# Patient Record
Sex: FEMALE | Race: BLACK OR AFRICAN AMERICAN | NOT HISPANIC OR LATINO | Employment: FULL TIME | URBAN - METROPOLITAN AREA
[De-identification: names, ages, dates, MRNs, and addresses within clinical notes are randomized per-mention and may not be internally consistent; named-entity substitution may affect disease eponyms.]

---

## 2017-07-10 ENCOUNTER — HOSPITAL ENCOUNTER (EMERGENCY)
Facility: HOSPITAL | Age: 32
Discharge: HOME | End: 2017-07-10
Attending: EMERGENCY MEDICINE

## 2017-07-10 VITALS
TEMPERATURE: 98 F | HEART RATE: 78 BPM | HEIGHT: 62 IN | RESPIRATION RATE: 16 BRPM | WEIGHT: 145 LBS | OXYGEN SATURATION: 100 % | DIASTOLIC BLOOD PRESSURE: 83 MMHG | SYSTOLIC BLOOD PRESSURE: 155 MMHG | BODY MASS INDEX: 26.68 KG/M2

## 2017-07-10 DIAGNOSIS — K08.89 TOOTHACHE: ICD-10-CM

## 2017-07-10 DIAGNOSIS — L30.8 PRURITIC DERMATITIS: Primary | ICD-10-CM

## 2017-07-10 DIAGNOSIS — K04.7 DENTAL ABSCESS: ICD-10-CM

## 2017-07-10 PROCEDURE — 99282 EMERGENCY DEPT VISIT SF MDM: CPT

## 2017-07-10 RX ORDER — NAPROXEN 375 MG/1
375 TABLET ORAL 2 TIMES DAILY WITH MEALS
Qty: 30 TABLET | Refills: 0 | Status: SHIPPED | OUTPATIENT
Start: 2017-07-10

## 2017-07-10 RX ORDER — FLUOCINONIDE 0.5 MG/G
OINTMENT TOPICAL 2 TIMES DAILY
Qty: 15 G | Refills: 0 | Status: SHIPPED | OUTPATIENT
Start: 2017-07-10 | End: 2017-07-20

## 2017-07-10 RX ORDER — PENICILLIN V POTASSIUM 500 MG/1
500 TABLET, FILM COATED ORAL 4 TIMES DAILY
Qty: 40 TABLET | Refills: 0 | Status: SHIPPED | OUTPATIENT
Start: 2017-07-10 | End: 2017-07-17

## 2017-07-10 NOTE — ED NOTES
Pt seen, evaluated, and discharged to lobby by provider without nursing assessment. See provider notes.

## 2017-07-10 NOTE — ED PROVIDER NOTES
SCRIBE #1 NOTE: I, Craig Soler, am scribing for, and in the presence of, Andrew aRusch MD. I have scribed the entire note.      History      Chief Complaint   Patient presents with    Wound Check     needs papers stating that her abscess was cleared up from 6 weeks ago; also has rash        Review of patient's allergies indicates:  No Known Allergies     HPI   HPI    7/10/2017, 5:44 PM   History obtained from the patient      History of Present Illness: Shereen Yadav is a 32 y.o. female patient who presents to the Emergency Department for a rash to her back which onset gradually 1 day ago. Symptoms are constant and moderate in severity. Pt also has left sided dental pain. Pt need clearance for steroids injections to her back stemming from an abscess 6 weeks ago.  No mitigating or exacerbating factors reported. No other associated sx reported. Patient denies any fever, chills, n/v/d, HA, lightheadedness, dizziness, CP, SOB, trouble swallowing, sore throat, cough, and all other sxs at this time. No further complaints or concerns at this time.         Arrival mode: Personal vehicle     PCP: Bertram Llamas MD       Past Medical History:  Unknown    Past Surgical History:  Unknown    Family History:  Unknown    Social History:  Social History     Social History Main Topics    Smoking status: Current Every Day Smoker     Types: Cigars    Smokeless tobacco: Unknown    Alcohol use No    Drug use: No    Sexual activity: Unknown       ROS   Review of Systems   Constitutional: Negative for chills and fever.   HENT: Positive for dental problem. Negative for sore throat and trouble swallowing.    Respiratory: Negative for cough and shortness of breath.    Cardiovascular: Negative for chest pain.   Gastrointestinal: Negative for diarrhea, nausea and vomiting.   Genitourinary: Negative for dysuria.   Musculoskeletal: Negative for back pain.   Skin: Positive for rash.   Neurological: Negative for dizziness,  "weakness, light-headedness, numbness and headaches.   Hematological: Does not bruise/bleed easily.       Physical Exam      Initial Vitals [07/10/17 1701]   BP Pulse Resp Temp SpO2   (!) 155/83 78 16 98.1 °F (36.7 °C) 100 %      MAP       107          Physical Exam  Nursing Notes and Vital Signs Reviewed.  Constitutional: Patient is in no apparent distress. Well-developed and well-nourished.  Head: Atraumatic. Normocephalic.  Eyes: PERRL. EOM intact. Conjunctivae are not pale. No scleral icterus.  ENT: Mucous membranes are moist. Oropharynx is clear and symmetric.    Mouth/Throat: No evident facial swelling. Patient handles secretions normally. Positive for multiple dental caries and broken teeth. No erythema or swelling.  No palpable fluctuance. No evidence of periodontal or periapical abscess. No trismus.   Neck: Supple. Full ROM. No lymphadenopathy.  Cardiovascular: Regular rate. Regular rhythm. No murmurs, rubs, or gallops. Distal pulses are 2+ and symmetric.  Pulmonary/Chest: No respiratory distress. Clear to auscultation bilaterally. No wheezing, rales, or rhonchi.  Abdominal: Soft and non-distended.  There is no tenderness.  No rebound, guarding, or rigidity.   Musculoskeletal: Moves all extremities. No obvious deformities. No edema.  Skin: Warm and dry. Plaques to her upper back.   Neurological:  Alert, awake, and appropriate.  Normal speech.  No acute focal neurological deficits are appreciated.  Psychiatric: Normal affect. Good eye contact. Appropriate in content.    ED Course    Procedures  ED Vital Signs:  Vitals:    07/10/17 1701   BP: (!) 155/83   Pulse: 78   Resp: 16   Temp: 98.1 °F (36.7 °C)   TempSrc: Oral   SpO2: 100%   Weight: 65.8 kg (145 lb)   Height: 5' 2" (1.575 m)                  The Emergency Provider reviewed the vital signs and test results, which are outlined above.    ED Discussion     5:49 PM:  Discussed pt dx  and plan of tx. Gave pt all f/u and return to the ED instructions. All " questions and concerns were addressed at this time. Pt expresses understanding of information and instructions, and is comfortable with plan to discharge. Pt is stable for discharge.        ED Medication(s):  Medications - No data to display    Discharge Medication List as of 7/10/2017  5:47 PM      START taking these medications    Details   fluocinonide (LIDEX) 0.05 % ointment Apply topically 2 (two) times daily., Starting Mon 7/10/2017, Until Thu 7/20/2017, Print      naproxen (NAPROSYN) 375 MG tablet Take 1 tablet (375 mg total) by mouth 2 (two) times daily with meals., Starting Mon 7/10/2017, Print      penicillin v potassium (VEETID) 500 MG tablet Take 1 tablet (500 mg total) by mouth 4 (four) times daily., Starting Mon 7/10/2017, Until Mon 7/17/2017, Print                   Medical Decision Making              Scribe Attestation:   Scribe #1: I performed the above scribed service and the documentation accurately describes the services I performed. I attest to the accuracy of the note.    Attending:   Physician Attestation Statement for Scribe #1: I, Andrew Rausch MD, personally performed the services described in this documentation, as scribed by Craig Soler, in my presence, and it is both accurate and complete.          Clinical Impression       ICD-10-CM ICD-9-CM   1. Pruritic dermatitis L29.9 698.9   2. Toothache K08.89 525.9   3. Dental abscess K04.7 522.5       Disposition:   Disposition: Discharged  Condition: Stable         Andrew Rausch MD  07/10/17 5093

## 2018-10-06 ENCOUNTER — HOSPITAL ENCOUNTER (EMERGENCY)
Facility: HOSPITAL | Age: 33
Discharge: HOME | End: 2018-10-06
Attending: FAMILY MEDICINE
Payer: COMMERCIAL

## 2018-10-06 VITALS
TEMPERATURE: 99 F | HEART RATE: 90 BPM | OXYGEN SATURATION: 98 % | SYSTOLIC BLOOD PRESSURE: 122 MMHG | WEIGHT: 147.5 LBS | BODY MASS INDEX: 27.14 KG/M2 | DIASTOLIC BLOOD PRESSURE: 82 MMHG | HEIGHT: 62 IN | RESPIRATION RATE: 20 BRPM

## 2018-10-06 DIAGNOSIS — R21 RASH AND NONSPECIFIC SKIN ERUPTION: Primary | ICD-10-CM

## 2018-10-06 DIAGNOSIS — B37.9 CANDIDA TROPICALIS INFECTION: ICD-10-CM

## 2018-10-06 DIAGNOSIS — J32.9 SINUSITIS, UNSPECIFIED CHRONICITY, UNSPECIFIED LOCATION: ICD-10-CM

## 2018-10-06 PROCEDURE — 99284 EMERGENCY DEPT VISIT MOD MDM: CPT

## 2018-10-06 RX ORDER — AZITHROMYCIN 250 MG/1
500 TABLET, FILM COATED ORAL DAILY
Qty: 5 TABLET | Refills: 0 | Status: SHIPPED | OUTPATIENT
Start: 2018-10-06

## 2018-10-06 RX ORDER — NYSTATIN 100000 U/G
OINTMENT TOPICAL 2 TIMES DAILY
Qty: 1 TUBE | Refills: 0 | Status: SHIPPED | OUTPATIENT
Start: 2018-10-06

## 2018-10-06 RX ORDER — FLUCONAZOLE 150 MG/1
150 TABLET ORAL DAILY
Qty: 1 TABLET | Refills: 0 | Status: SHIPPED | OUTPATIENT
Start: 2018-10-06 | End: 2018-10-07

## 2018-10-06 NOTE — ED PROVIDER NOTES
SCRIBE #1 NOTE: I, Christelle Padilla, am scribing for, and in the presence of, Courtney Beard MD. I have scribed the entire note.         History     Chief Complaint   Patient presents with    Allergic Reaction     pt c/o rash from changing laundry detergent    URI     pt c/o cold symptoms and pain to L side of face       Review of patient's allergies indicates:  No Known Allergies      History of Present Illness   HPI    10/6/2018, 3:34 AM  History obtained from the patient      History of Present Illness: Shereen Yadav is a 33 y.o. female patient with PMHx of eczema and psoriasis who presents to the Emergency Department for rash which onset gradually yesterday. Patient reports recently changing laundry detergents. Patient reports hx of similar sxs when using a new detergent, new soap, and new sprays. Patient states sxs usually improve with nystatin cream and diflucan. Symptoms are constant and moderate in severity. The patient describes the sxs as located to chest and neck. No mitigating or exacerbating factors reported. No associated sxs included. Patient also c/o sore throat, L sided sinus pain, and congestion onset 5 days ago. Patient denies any fever, chills, tongue swelling, difficulty swallowing, wheezing, CP, SOB, N/V/D, and all other sxs at this time. No further complaints or concerns at this time.     Arrival mode: Personal vehicle    PCP: Bertram Llamas MD        Past Medical History:  Eczema  Psoriasis    Past Surgical History:  History reviewed. No pertinent surgical history.      Family History:  History reviewed. No pertinent family history.    Social History:  Social History     Tobacco Use    Smoking status: Current Every Day Smoker     Types: Cigars   Substance and Sexual Activity    Alcohol use: No    Drug use: No    Sexual activity: Unknown        Review of Systems   Review of Systems   Constitutional: Negative for fever.   HENT: Positive for congestion, sinus pain (L sided) and  sore throat. Negative for trouble swallowing.         (-) tongue swelling   Respiratory: Negative for shortness of breath and wheezing.    Cardiovascular: Negative for chest pain.   Gastrointestinal: Negative for diarrhea, nausea and vomiting.   Genitourinary: Negative for dysuria.   Musculoskeletal: Negative for back pain.   Skin: Positive for rash (chest and neck).   Neurological: Negative for weakness.   Hematological: Does not bruise/bleed easily.   All other systems reviewed and are negative.       Physical Exam     Initial Vitals [10/06/18 0141]   BP Pulse Resp Temp SpO2   122/82 90 20 99.4 °F (37.4 °C) 98 %      MAP       --          Physical Exam  Nursing Notes and Vital Signs Reviewed.  Constitutional: Patient is in no acute distress. Well-developed and well-nourished.  Head: Atraumatic. Normocephalic.  Eyes: PERRL. EOM intact. Conjunctivae are not pale. No scleral icterus.  Ears: Right TM normal. Left TM normal. No erythema. No bulging. No effusion or air-fluid levels. No perforation.   Nose: Patent nares. Turbinates are normal. No drainage.   Throat: Moist mucous membranes. Posterior oropharynx is symmetric without erythema. Tonsillar exudate is not present. No trismus. Normal handling of secretions. No stridor. Airway patent. No tongue edema.  Neck: Supple. Full ROM. No lymphadenopathy.  Cardiovascular: Regular rate. Regular rhythm. No murmurs, rubs, or gallops. Distal pulses are 2+ and symmetric.  Pulmonary/Chest: No respiratory distress. Clear to auscultation bilaterally. No wheezing or rales.  Abdominal: Soft and non-distended.  There is no tenderness.  No rebound, guarding, or rigidity. Good bowel sounds.  Genitourinary: No CVA tenderness  Musculoskeletal: Moves all extremities. No obvious deformities. No edema. No calf tenderness.  Skin: Warm and dry. Mild erythema to anterior chest and under neck, no signs of infection or excoriations  Neurological:  Alert, awake, and appropriate.  Normal speech.   "No acute focal neurological deficits are appreciated.  Psychiatric: Normal affect. Good eye contact. Appropriate in content.     ED Course   Procedures  ED Vital Signs:  Vitals:    10/06/18 0141   BP: 122/82   Pulse: 90   Resp: 20   Temp: 99.4 °F (37.4 °C)   TempSrc: Oral   SpO2: 98%   Weight: 66.9 kg (147 lb 7.8 oz)   Height: 5' 2" (1.575 m)               The Emergency Provider reviewed the vital signs and test results, which are outlined above.     ED Discussion     3:43 AM: Discussed with pt all pertinent ED information and results. Discussed pt dx and plan of tx. Gave pt all f/u and return to the ED instructions. All questions and concerns were addressed at this time. Pt expresses understanding of information and instructions, and is comfortable with plan to discharge. Pt is stable for discharge.    I discussed with patient and/or family/caretaker that evaluation in the ED does not suggest any emergent or life threatening medical conditions requiring immediate intervention beyond what was provided in the ED, and I believe patient is safe for discharge.  Regardless, an unremarkable evaluation in the ED does not preclude the development or presence of a serious of life threatening condition. As such, patient was instructed to return immediately for any worsening or change in current symptoms.    Patient is safe for discharge. There is no suggestion of airway or ENT emergency. Patient is hemodynamically stable and there is no suggestion of active anaphylaxis or progressive worsening of current symptoms.          ED Medication(s):  Medications - No data to display    Current Discharge Medication List      START taking these medications    Details   azithromycin (ZITHROMAX Z-MERRY) 250 MG tablet Take 2 tablets (500 mg total) by mouth once daily.  Qty: 5 tablet, Refills: 0      fluconazole (DIFLUCAN) 150 MG Tab Take 1 tablet (150 mg total) by mouth once daily. for 1 day  Qty: 1 tablet, Refills: 0      !! nystatin " (MYCOSTATIN) ointment Apply topically 2 (two) times daily.  Qty: 1 Tube, Refills: 0       !! - Potential duplicate medications found. Please discuss with provider.          Follow-up Information     Schedule an appointment as soon as possible for a visit  with Bertram Llamas MD.    Specialty:  Family Medicine  Why:  As needed  Contact information:  [address]  [place] [address]  [phone]                        Medical Decision Making                 Scribe Attestation:   Scribe #1: I performed the above scribed service and the documentation accurately describes the services I performed. I attest to the accuracy of the note.     Attending:   Physician Attestation Statement for Scribe #1: I, Courtney Beard MD, personally performed the services described in this documentation, as scribed by Christelle Padilla, in my presence, and it is both accurate and complete.           Clinical Impression       ICD-10-CM ICD-9-CM   1. Rash and nonspecific skin eruption R21 782.1   2. Candida tropicalis infection B37.9 112.9   3. Sinusitis, unspecified chronicity, unspecified location J32.9 473.9       Disposition:   Disposition: Discharged  Condition: Stable         Courtney Beard MD  10/06/18 2123       Courtney Beard MD  10/06/18 2123